# Patient Record
Sex: FEMALE | Race: WHITE
[De-identification: names, ages, dates, MRNs, and addresses within clinical notes are randomized per-mention and may not be internally consistent; named-entity substitution may affect disease eponyms.]

---

## 2018-07-12 ENCOUNTER — APPOINTMENT (OUTPATIENT)
Dept: CARDIOLOGY | Facility: CLINIC | Age: 38
End: 2018-07-12
Payer: MEDICAID

## 2018-07-12 ENCOUNTER — NON-APPOINTMENT (OUTPATIENT)
Age: 38
End: 2018-07-12

## 2018-07-12 VITALS — SYSTOLIC BLOOD PRESSURE: 132 MMHG | DIASTOLIC BLOOD PRESSURE: 82 MMHG

## 2018-07-12 VITALS — SYSTOLIC BLOOD PRESSURE: 134 MMHG | DIASTOLIC BLOOD PRESSURE: 88 MMHG

## 2018-07-12 VITALS
SYSTOLIC BLOOD PRESSURE: 136 MMHG | WEIGHT: 122 LBS | BODY MASS INDEX: 20.83 KG/M2 | HEIGHT: 64 IN | DIASTOLIC BLOOD PRESSURE: 82 MMHG

## 2018-07-12 DIAGNOSIS — Z3A.12 12 WEEKS GESTATION OF PREGNANCY: ICD-10-CM

## 2018-07-12 DIAGNOSIS — Z86.32 PERSONAL HISTORY OF GESTATIONAL DIABETES: ICD-10-CM

## 2018-07-12 DIAGNOSIS — Z82.49 FAMILY HISTORY OF ISCHEMIC HEART DISEASE AND OTHER DISEASES OF THE CIRCULATORY SYSTEM: ICD-10-CM

## 2018-07-12 PROBLEM — Z00.00 ENCOUNTER FOR PREVENTIVE HEALTH EXAMINATION: Status: ACTIVE | Noted: 2018-07-12

## 2018-07-12 PROCEDURE — 99204 OFFICE O/P NEW MOD 45 MIN: CPT

## 2018-07-12 PROCEDURE — 93000 ELECTROCARDIOGRAM COMPLETE: CPT

## 2018-09-10 ENCOUNTER — NON-APPOINTMENT (OUTPATIENT)
Age: 38
End: 2018-09-10

## 2018-09-10 ENCOUNTER — APPOINTMENT (OUTPATIENT)
Dept: CARDIOLOGY | Facility: CLINIC | Age: 38
End: 2018-09-10
Payer: MEDICAID

## 2018-09-10 VITALS — SYSTOLIC BLOOD PRESSURE: 137 MMHG | DIASTOLIC BLOOD PRESSURE: 88 MMHG

## 2018-09-10 VITALS
WEIGHT: 130 LBS | HEART RATE: 87 BPM | BODY MASS INDEX: 22.31 KG/M2 | SYSTOLIC BLOOD PRESSURE: 148 MMHG | OXYGEN SATURATION: 99 % | DIASTOLIC BLOOD PRESSURE: 82 MMHG

## 2018-09-10 PROCEDURE — 93000 ELECTROCARDIOGRAM COMPLETE: CPT

## 2018-09-10 PROCEDURE — 99213 OFFICE O/P EST LOW 20 MIN: CPT

## 2018-09-20 ENCOUNTER — APPOINTMENT (OUTPATIENT)
Dept: CARDIOLOGY | Facility: CLINIC | Age: 38
End: 2018-09-20
Payer: MEDICAID

## 2018-09-20 PROCEDURE — 93306 TTE W/DOPPLER COMPLETE: CPT

## 2018-12-10 ENCOUNTER — APPOINTMENT (OUTPATIENT)
Dept: CARDIOLOGY | Facility: CLINIC | Age: 38
End: 2018-12-10
Payer: MEDICAID

## 2018-12-10 ENCOUNTER — NON-APPOINTMENT (OUTPATIENT)
Age: 38
End: 2018-12-10

## 2018-12-10 VITALS
HEART RATE: 90 BPM | SYSTOLIC BLOOD PRESSURE: 137 MMHG | BODY MASS INDEX: 21.68 KG/M2 | OXYGEN SATURATION: 99 % | HEIGHT: 64 IN | WEIGHT: 127 LBS | DIASTOLIC BLOOD PRESSURE: 78 MMHG | RESPIRATION RATE: 16 BRPM

## 2018-12-10 DIAGNOSIS — Z34.90 ENCOUNTER FOR SUPERVISION OF NORMAL PREGNANCY, UNSPECIFIED, UNSPECIFIED TRIMESTER: ICD-10-CM

## 2018-12-10 DIAGNOSIS — Z3A.33 33 WEEKS GESTATION OF PREGNANCY: ICD-10-CM

## 2018-12-10 PROCEDURE — 99214 OFFICE O/P EST MOD 30 MIN: CPT

## 2018-12-10 PROCEDURE — 93000 ELECTROCARDIOGRAM COMPLETE: CPT

## 2018-12-10 RX ORDER — CHOLECALCIFEROL (VITAMIN D3) 25 MCG
25 MCG TABLET ORAL
Refills: 0 | Status: ACTIVE | COMMUNITY
Start: 2018-12-10

## 2018-12-10 RX ORDER — ASCORBIC ACID, CALCIUM CITRATE, IRON, VITAMIN D, DL- ALPHA- TOCOPHEROL ACETATE, THIAMINE, RIBOFLAVIN, NIACINAMIDE, PYRIDOXINE HYDROCHLORIDE, FOLIC ACID, IODINE, ZINC, COPPER, DOCUSATE SODIUM, DOCONEXENT AND ICOSAPENT
35-1 & 300 KIT DAILY
Refills: 0 | Status: ACTIVE | COMMUNITY
Start: 2018-12-10

## 2018-12-10 RX ORDER — CALCIUM CARBONATE/VITAMIN D3 600 MG-20
600-800 TABLET ORAL
Refills: 0 | Status: ACTIVE | COMMUNITY
Start: 2018-12-10

## 2018-12-10 RX ORDER — METHYLDOPA 250 MG/1
250 TABLET, FILM COATED ORAL
Qty: 60 | Refills: 2 | Status: DISCONTINUED | COMMUNITY
Start: 2018-09-10 | End: 2018-12-10

## 2018-12-10 NOTE — ASSESSMENT
[FreeTextEntry1] : hypertension - controlled on labetalol.  Low sodium diet.  No significant diastolic dysfunction on echo. \par pregnancy - due next month\par \par RTC 4 months. \par \par   Thank you for allowing me to participate in your patient's care.  Please contact me if there are any questions or concerns.

## 2018-12-10 NOTE — HISTORY OF PRESENT ILLNESS
[FreeTextEntry1] : 39 y/o F with hx of nephrolithiasis, currently 12 weeks pregnant, with hx of gestational hypertension 2011, has been on losartan since postpartum.  .   Previously on losartan 25mg daily.  Placed on labetalol 200 mg tid 6/11/18.    Now tolerating labetalol.  Sometimes falls asleep before taking the evening dose (was scheduled 11 pm).  No chest pain or shortness of breath.  No palpitations.  No dizziness or syncope.  Having a lot of nausea/vomiting.  \par Pregnancies - 3 , 1 miscarriage @ 3 months gestation.  Normal full term pregnancies.  No gestational diabetes.  Had gestational hypertension 2011.  \par \par 9/10/18 Returns for follow up.  In the interim, pregnancy going well.  Occasional headaches.  BP borderline.  No dizziness, no  chest pain or shortness of breath.   CC Selena Johns  (obgyn)\par \par 12/10/18 Returns for follow up.  Now 33 weeks pregnant.  In the interim, started on methyldopa last visit (she only was taking it, stopped labetalol for a week - noticed her BP was uncontrolled, went back to labetalol).  Only on labetalol now.  Occasional headaches in the morning.  TTE essentially normal. No chest pain or SOB.  Sometimes borderline BP -  high 130s/ 80s, which is what she normally runs pre-pregnancy. .  \par \par

## 2018-12-10 NOTE — REASON FOR VISIT
[Follow-Up - Clinic] : a clinic follow-up of [Hypertension] : hypertension [[employer] ] : provided by [employer]

## 2018-12-10 NOTE — PHYSICAL EXAM
[General Appearance - Well Developed] : well developed [Normal Appearance] : normal appearance [Well Groomed] : well groomed [General Appearance - Well Nourished] : well nourished [No Deformities] : no deformities [General Appearance - In No Acute Distress] : no acute distress [Normal Conjunctiva] : the conjunctiva exhibited no abnormalities [Eyelids - No Xanthelasma] : the eyelids demonstrated no xanthelasmas [Normal Oral Mucosa] : normal oral mucosa [No Oral Pallor] : no oral pallor [No Oral Cyanosis] : no oral cyanosis [Normal Jugular Venous V Waves Present] : normal jugular venous V waves present [Respiration, Rhythm And Depth] : normal respiratory rhythm and effort [Exaggerated Use Of Accessory Muscles For Inspiration] : no accessory muscle use [Auscultation Breath Sounds / Voice Sounds] : lungs were clear to auscultation bilaterally [Heart Sounds] : normal S1 and S2 [Murmurs] : no murmurs present [Arterial Pulses Normal] : the arterial pulses were normal [Edema] : no peripheral edema present [Veins - Varicosity Changes] : no varicosital changes were noted in the lower extremities [Abnormal Walk] : normal gait [Nail Clubbing] : no clubbing of the fingernails [Cyanosis, Localized] : no localized cyanosis [Petechial Hemorrhages (___cm)] : no petechial hemorrhages [Skin Color & Pigmentation] : normal skin color and pigmentation [] : no rash [No Venous Stasis] : no venous stasis [Skin Lesions] : no skin lesions [No Skin Ulcers] : no skin ulcer [No Xanthoma] : no  xanthoma was observed [Oriented To Time, Place, And Person] : oriented to person, place, and time [Affect] : the affect was normal [Mood] : the mood was normal [No Anxiety] : not feeling anxious [FreeTextEntry1] : gravid

## 2019-04-15 ENCOUNTER — APPOINTMENT (OUTPATIENT)
Dept: CARDIOLOGY | Facility: CLINIC | Age: 39
End: 2019-04-15

## 2019-05-28 ENCOUNTER — APPOINTMENT (OUTPATIENT)
Dept: CARDIOLOGY | Facility: CLINIC | Age: 39
End: 2019-05-28
Payer: COMMERCIAL

## 2019-05-28 ENCOUNTER — NON-APPOINTMENT (OUTPATIENT)
Age: 39
End: 2019-05-28

## 2019-05-28 VITALS
SYSTOLIC BLOOD PRESSURE: 120 MMHG | HEART RATE: 88 BPM | OXYGEN SATURATION: 96 % | DIASTOLIC BLOOD PRESSURE: 70 MMHG | WEIGHT: 130 LBS | HEIGHT: 64 IN | BODY MASS INDEX: 22.2 KG/M2

## 2019-05-28 DIAGNOSIS — I10 ESSENTIAL (PRIMARY) HYPERTENSION: ICD-10-CM

## 2019-05-28 PROCEDURE — 93000 ELECTROCARDIOGRAM COMPLETE: CPT

## 2019-05-28 PROCEDURE — 99214 OFFICE O/P EST MOD 30 MIN: CPT

## 2019-05-28 RX ORDER — LABETALOL HYDROCHLORIDE 200 MG/1
200 TABLET, FILM COATED ORAL 3 TIMES DAILY
Qty: 90 | Refills: 11 | Status: ACTIVE | COMMUNITY
Start: 2018-06-12 | End: 1900-01-01

## 2019-05-28 RX ORDER — ASPIRIN 81 MG/1
81 TABLET ORAL DAILY
Refills: 0 | Status: DISCONTINUED | COMMUNITY
Start: 2018-12-10 | End: 2019-05-28

## 2019-05-28 NOTE — HISTORY OF PRESENT ILLNESS
[FreeTextEntry1] : 37 y/o F with hx of nephrolithiasis, currently 12 weeks pregnant, with hx of gestational hypertension 2011, has been on losartan since postpartum.  .   Previously on losartan 25mg daily.  Placed on labetalol 200 mg tid 6/11/18.    Now tolerating labetalol.  Sometimes falls asleep before taking the evening dose (was scheduled 11 pm).  No chest pain or shortness of breath.  No palpitations.  No dizziness or syncope.  Having a lot of nausea/vomiting.  \par Pregnancies - 3 , 1 miscarriage @ 3 months gestation.  Normal full term pregnancies.  No gestational diabetes.  Had gestational hypertension 2011.  \par \par 9/10/18 Returns for follow up.  In the interim, pregnancy going well.  Occasional headaches.  BP borderline.  No dizziness, no  chest pain or shortness of breath.   CC Selena Johns  (obgyn)\par \par 12/10/18 Returns for follow up.  Now 33 weeks pregnant.  In the interim, started on methyldopa last visit (she only was taking it, stopped labetalol for a week - noticed her BP was uncontrolled, went back to labetalol).  Only on labetalol now.  Occasional headaches in the morning.  TTE essentially normal. No chest pain or SOB.  Sometimes borderline BP -  high 130s/ 80s, which is what she normally runs pre-pregnancy. \par \par 5/28/19 Returns for follow up.  In the interim, now post partum.  Doing well.  Breastfeeding.  Plans to breastfeed until 1/2020. Baby's growth and development has been normal.  \par .  \par \par

## 2019-05-28 NOTE — REASON FOR VISIT
[Follow-Up - Clinic] : a clinic follow-up of [[employer] ] : provided by [employer]   [Hypertension] : hypertension

## 2019-05-28 NOTE — ASSESSMENT
[FreeTextEntry1] : hypertension - controlled on labetalol.  Low sodium diet.  No significant diastolic dysfunction on echo. HRs not low, do not suspect significant bradycardia in baby given she's breastfeeding.  Will monitor.  Once no longer breastfeeding will change regimen - probably some combination ACEi/CCB/diuretic\par \par RTC 6 months\par \par   Thank you for allowing me to participate in your patient's care.  Please contact me if there are any questions or concerns.

## 2019-05-28 NOTE — PHYSICAL EXAM
[Well Groomed] : well groomed [General Appearance - Well Developed] : well developed [Normal Appearance] : normal appearance [No Deformities] : no deformities [General Appearance - Well Nourished] : well nourished [Normal Conjunctiva] : the conjunctiva exhibited no abnormalities [General Appearance - In No Acute Distress] : no acute distress [Eyelids - No Xanthelasma] : the eyelids demonstrated no xanthelasmas [No Oral Pallor] : no oral pallor [Normal Oral Mucosa] : normal oral mucosa [Normal Jugular Venous V Waves Present] : normal jugular venous V waves present [No Oral Cyanosis] : no oral cyanosis [Respiration, Rhythm And Depth] : normal respiratory rhythm and effort [Auscultation Breath Sounds / Voice Sounds] : lungs were clear to auscultation bilaterally [Exaggerated Use Of Accessory Muscles For Inspiration] : no accessory muscle use [Heart Sounds] : normal S1 and S2 [Murmurs] : no murmurs present [Edema] : no peripheral edema present [Arterial Pulses Normal] : the arterial pulses were normal [Veins - Varicosity Changes] : no varicosital changes were noted in the lower extremities [Abnormal Walk] : normal gait [Nail Clubbing] : no clubbing of the fingernails [Cyanosis, Localized] : no localized cyanosis [Petechial Hemorrhages (___cm)] : no petechial hemorrhages [Skin Color & Pigmentation] : normal skin color and pigmentation [] : no rash [No Venous Stasis] : no venous stasis [Skin Lesions] : no skin lesions [No Skin Ulcers] : no skin ulcer [No Xanthoma] : no  xanthoma was observed [Oriented To Time, Place, And Person] : oriented to person, place, and time [Mood] : the mood was normal [Affect] : the affect was normal [No Anxiety] : not feeling anxious [Heart Rate And Rhythm] : heart rate and rhythm were normal [Bowel Sounds] : normal bowel sounds [Abdomen Soft] : soft [Abdomen Tenderness] : non-tender [Gait - Sufficient For Exercise Testing] : the gait was sufficient for exercise testing [FreeTextEntry1] : no JVD, no carotid bruits

## 2019-12-03 ENCOUNTER — APPOINTMENT (OUTPATIENT)
Dept: CARDIOLOGY | Facility: CLINIC | Age: 39
End: 2019-12-03

## 2021-10-06 PROBLEM — I10 ESSENTIAL HYPERTENSION: Status: ACTIVE | Noted: 2018-07-12

## 2022-07-31 ENCOUNTER — NON-APPOINTMENT (OUTPATIENT)
Age: 42
End: 2022-07-31

## 2022-08-22 ENCOUNTER — NON-APPOINTMENT (OUTPATIENT)
Age: 42
End: 2022-08-22

## 2024-11-04 ENCOUNTER — OFFICE (OUTPATIENT)
Dept: URBAN - METROPOLITAN AREA CLINIC 116 | Facility: CLINIC | Age: 44
Setting detail: OPHTHALMOLOGY
End: 2024-11-04
Payer: COMMERCIAL

## 2024-11-04 DIAGNOSIS — H25.13: ICD-10-CM

## 2024-11-04 DIAGNOSIS — H47.092: ICD-10-CM

## 2024-11-04 PROBLEM — H11.153 PINGUECULA; BOTH EYES: Status: ACTIVE | Noted: 2024-11-04

## 2024-11-04 PROBLEM — H52.4 PRESBYOPIA ; BOTH EYES: Status: ACTIVE | Noted: 2024-11-04

## 2024-11-04 PROCEDURE — 92250 FUNDUS PHOTOGRAPHY W/I&R: CPT | Performed by: OPTOMETRIST

## 2024-11-04 PROCEDURE — 99213 OFFICE O/P EST LOW 20 MIN: CPT | Performed by: OPTOMETRIST

## 2024-11-04 ASSESSMENT — KERATOMETRY
OD_AXISANGLE_DEGREES: 090
OD_K2POWER_DIOPTERS: 43.00
OS_AXISANGLE_DEGREES: 089
OS_K1POWER_DIOPTERS: 42.75
OS_K2POWER_DIOPTERS: 43.50
OD_K1POWER_DIOPTERS: 43.00

## 2024-11-04 ASSESSMENT — REFRACTION_MANIFEST
OD_ADD: +1.50
OS_VA1: 20/20
OD_VA1: 20/20-
OD_AXIS: 70
OD_SPHERE: +0.50
OS_CYLINDER: SPH
OD_SPHERE: +0.50
OS_ADD: +1.50
OS_SPHERE: +0.75
OD_CYLINDER: SPH
OS_ADD: +1.50
OS_CYLINDER: -0.50
OS_AXIS: 110
OD_CYLINDER: -0.50
OS_VA1: 20/20-
OD_VA1: 20/20
OD_ADD: +1.50
OS_SPHERE: +1.00

## 2024-11-04 ASSESSMENT — REFRACTION_AUTOREFRACTION
OD_SPHERE: +1.25
OS_AXIS: 131
OD_CYLINDER: -0.25
OS_CYLINDER: -0.25
OS_SPHERE: +1.75
OD_AXIS: 046

## 2024-11-04 ASSESSMENT — TONOMETRY: OS_IOP_MMHG: 21

## 2024-11-04 ASSESSMENT — VISUAL ACUITY
OS_BCVA: 20/30
OD_BCVA: 20/50

## 2024-11-04 ASSESSMENT — CONFRONTATIONAL VISUAL FIELD TEST (CVF)
OD_FINDINGS: FULL
OS_FINDINGS: FULL